# Patient Record
Sex: FEMALE | Race: WHITE
[De-identification: names, ages, dates, MRNs, and addresses within clinical notes are randomized per-mention and may not be internally consistent; named-entity substitution may affect disease eponyms.]

---

## 2019-06-13 ENCOUNTER — HOSPITAL ENCOUNTER (EMERGENCY)
Dept: HOSPITAL 4 - SED | Age: 29
Discharge: HOME | End: 2019-06-13
Payer: MEDICAID

## 2019-06-13 VITALS — SYSTOLIC BLOOD PRESSURE: 90 MMHG

## 2019-06-13 VITALS — BODY MASS INDEX: 25.49 KG/M2 | WEIGHT: 153 LBS | HEIGHT: 65 IN

## 2019-06-13 VITALS — SYSTOLIC BLOOD PRESSURE: 103 MMHG

## 2019-06-13 DIAGNOSIS — G43.909: ICD-10-CM

## 2019-06-13 DIAGNOSIS — Z88.0: ICD-10-CM

## 2019-06-13 DIAGNOSIS — N12: Primary | ICD-10-CM

## 2019-06-13 DIAGNOSIS — J45.909: ICD-10-CM

## 2019-06-13 LAB
ALBUMIN SERPL BCP-MCNC: 3.5 G/DL (ref 3.4–4.8)
ALT SERPL W P-5'-P-CCNC: 47 U/L (ref 12–78)
ANION GAP SERPL CALCULATED.3IONS-SCNC: 11 MMOL/L (ref 5–15)
APPEARANCE UR: (no result)
AST SERPL W P-5'-P-CCNC: 43 U/L (ref 10–37)
BACTERIA URNS QL MICRO: (no result) /HPF
BASOPHILS # BLD AUTO: 0 K/UL (ref 0–0.2)
BASOPHILS NFR BLD AUTO: 0.4 % (ref 0–2)
BILIRUB SERPL-MCNC: 0.8 MG/DL (ref 0–1)
BILIRUB UR QL STRIP: NEGATIVE
BUN SERPL-MCNC: 8 MG/DL (ref 8–21)
CALCIUM SERPL-MCNC: 9.3 MG/DL (ref 8.4–11)
CHLORIDE SERPL-SCNC: 102 MMOL/L (ref 98–107)
COLOR UR: YELLOW
CREAT SERPL-MCNC: 0.91 MG/DL (ref 0.55–1.3)
EOSINOPHIL # BLD AUTO: 0 K/UL (ref 0–0.4)
EOSINOPHIL NFR BLD AUTO: 0 % (ref 0–4)
ERYTHROCYTE [DISTWIDTH] IN BLOOD BY AUTOMATED COUNT: 13.7 % (ref 9–15)
GFR SERPL CREATININE-BSD FRML MDRD: 95 ML/MIN (ref 90–?)
GLUCOSE SERPL-MCNC: 118 MG/DL (ref 70–99)
GLUCOSE UR STRIP-MCNC: NEGATIVE MG/DL
HCT VFR BLD AUTO: 36.4 % (ref 36–48)
HGB BLD-MCNC: 12.2 G/DL (ref 12–16)
HGB UR QL STRIP: (no result)
KETONES UR STRIP-MCNC: (no result) MG/DL
LEUKOCYTE ESTERASE UR QL STRIP: (no result)
LYMPHOCYTES # BLD AUTO: 0.6 K/UL (ref 1–5.5)
LYMPHOCYTES NFR BLD AUTO: 6 % (ref 20.5–51.5)
MCH RBC QN AUTO: 32 PG (ref 27–31)
MCHC RBC AUTO-ENTMCNC: 34 % (ref 32–36)
MCV RBC AUTO: 95 FL (ref 79–98)
MONOCYTES # BLD MANUAL: 0.5 K/UL (ref 0–1)
MONOCYTES # BLD MANUAL: 4.8 % (ref 1.7–9.3)
MUCOUS THREADS URNS QL MICRO: (no result) /LPF
NEUTROPHILS # BLD AUTO: 8.8 K/UL (ref 1.8–7.7)
NEUTROPHILS NFR BLD AUTO: 88.8 % (ref 40–70)
NITRITE UR QL STRIP: POSITIVE
PH UR STRIP: 6 [PH] (ref 5–8)
PLATELET # BLD AUTO: 151 K/UL (ref 130–430)
POTASSIUM SERPL-SCNC: 3.4 MMOL/L (ref 3.5–5.1)
PROT UR QL STRIP: (no result)
RBC # BLD AUTO: 3.82 MIL/UL (ref 4.2–6.2)
RBC #/AREA URNS HPF: (no result) /HPF (ref 0–3)
SODIUM SERPLBLD-SCNC: 136 MMOL/L (ref 136–145)
SP GR UR STRIP: 1.01 (ref 1–1.03)
UROBILINOGEN UR STRIP-MCNC: 0.2 MG/DL (ref 0.2–1)
WBC # BLD AUTO: 9.9 K/UL (ref 4.8–10.8)
WBC #/AREA URNS HPF: >100 /HPF (ref 0–3)

## 2019-06-13 PROCEDURE — 87040 BLOOD CULTURE FOR BACTERIA: CPT

## 2019-06-13 PROCEDURE — 87086 URINE CULTURE/COLONY COUNT: CPT

## 2019-06-13 PROCEDURE — 80053 COMPREHEN METABOLIC PANEL: CPT

## 2019-06-13 PROCEDURE — 96375 TX/PRO/DX INJ NEW DRUG ADDON: CPT

## 2019-06-13 PROCEDURE — 81025 URINE PREGNANCY TEST: CPT

## 2019-06-13 PROCEDURE — 87186 SC STD MICRODIL/AGAR DIL: CPT

## 2019-06-13 PROCEDURE — 85025 COMPLETE CBC W/AUTO DIFF WBC: CPT

## 2019-06-13 PROCEDURE — 96365 THER/PROPH/DIAG IV INF INIT: CPT

## 2019-06-13 PROCEDURE — 99283 EMERGENCY DEPT VISIT LOW MDM: CPT

## 2019-06-13 PROCEDURE — 81000 URINALYSIS NONAUTO W/SCOPE: CPT

## 2019-06-13 PROCEDURE — 36415 COLL VENOUS BLD VENIPUNCTURE: CPT

## 2019-06-13 PROCEDURE — 83605 ASSAY OF LACTIC ACID: CPT

## 2019-06-13 PROCEDURE — 96361 HYDRATE IV INFUSION ADD-ON: CPT

## 2024-03-18 NOTE — NUR
# 20 gauge angiocath placed to LAC.  Use of asceptic technique.  Opsite placed 
over site.  Blood return noted.  Blood for lab drawn from site, BC x2, and 
lactic acid.  Flushed with 10 cc of normal saline.  No evidence of infiltration 
noted.  Patient tolerated well.
Benadryl and Solumedrol given for relief of itching, and burning to ears. 
Patient given antibiotics with allergies to Penicillan. Patient tolerated well. 
Will continue to follow up and monitor.
ER  at bedside examining patient.
Patient arrived via POV, AAOx4, and ambulatory with steady gait. Patient c/c of 
right flank pain, general unwell feeling. Patient states flank pain began on 
Monday, worsening through the week. States it is finals week and she didnt want 
to miss exams. Patient states pain is worse today. 8/10, sharp and flank 
radiating to back. Patient states she has lethary, decreased appetite, burning 
upon urination, feels like she is retaining urine, increased frequency with 
dribbling. Will continue to follow up and monitor.
Patient given Tylenol for headache relief and fever control.
Patient given toradol for pain managment, headache 10/10.
Patient given written and verbal discharge instructions and verbalizes 
understanding.  ER MD discussed with patient the results and treatment 
provided. Patient in stable condition. ID arm band removed. IV catheter removed 
intact and dressing applied, no active bleeding.

Rx of Naprosyn and cipro given. Patient educated on pain management and to 
follow up with PMD. Pain Scale 0.

Opportunity for questions provided and answered. Medication side effect fact 
sheet provided.
Patient report given to GABRIELA Cartagena.
Patient to ER bed  to gown for evaluation. Side rails up.

Report given to GABRIELA Alexandre.
[Family History Reviewed and Updated] : family history reviewed and updated